# Patient Record
Sex: FEMALE | URBAN - METROPOLITAN AREA
[De-identification: names, ages, dates, MRNs, and addresses within clinical notes are randomized per-mention and may not be internally consistent; named-entity substitution may affect disease eponyms.]

---

## 2019-10-03 ENCOUNTER — APPOINTMENT (RX ONLY)
Dept: URBAN - METROPOLITAN AREA CLINIC 73 | Facility: CLINIC | Age: 54
Setting detail: DERMATOLOGY
End: 2019-10-03

## 2019-10-03 DIAGNOSIS — L23.89 ALLERGIC CONTACT DERMATITIS DUE TO OTHER AGENTS: ICD-10-CM

## 2019-10-03 PROCEDURE — ? TREATMENT REGIMEN

## 2019-10-03 PROCEDURE — ? PRESCRIPTION

## 2019-10-03 PROCEDURE — ? OTHER

## 2019-10-03 PROCEDURE — 99202 OFFICE O/P NEW SF 15 MIN: CPT

## 2019-10-03 PROCEDURE — ? COUNSELING

## 2019-10-03 RX ORDER — CLOBETASOL PROPIONATE 0.5 MG/G
OINTMENT TOPICAL
Qty: 1 | Refills: 1 | Status: ERX | COMMUNITY
Start: 2019-10-03

## 2019-10-03 RX ORDER — PREDNISONE 10 MG/1
TABLET ORAL
Qty: 84 | Refills: 0 | Status: ERX | COMMUNITY
Start: 2019-10-03

## 2019-10-03 RX ADMIN — CLOBETASOL PROPIONATE: 0.5 OINTMENT TOPICAL at 16:11

## 2019-10-03 RX ADMIN — PREDNISONE: 10 TABLET ORAL at 16:09

## 2019-10-03 ASSESSMENT — LOCATION ZONE DERM
LOCATION ZONE: NECK
LOCATION ZONE: ARM

## 2019-10-03 ASSESSMENT — LOCATION SIMPLE DESCRIPTION DERM
LOCATION SIMPLE: RIGHT ANTERIOR NECK
LOCATION SIMPLE: LEFT FOREARM
LOCATION SIMPLE: RIGHT FOREARM

## 2019-10-03 ASSESSMENT — LOCATION DETAILED DESCRIPTION DERM
LOCATION DETAILED: LEFT PROXIMAL RADIAL DORSAL FOREARM
LOCATION DETAILED: RIGHT PROXIMAL DORSAL FOREARM
LOCATION DETAILED: RIGHT INFERIOR ANTERIOR NECK

## 2019-10-03 ASSESSMENT — PAIN INTENSITY VAS: HOW INTENSE IS YOUR PAIN 0 BEING NO PAIN, 10 BEING THE MOST SEVERE PAIN POSSIBLE?: NO PAIN

## 2019-10-03 NOTE — PROCEDURE: TREATMENT REGIMEN
Initiate Treatment: Prednisone 10mg take 6 tabs every morning x 4 days, take 5 tabs QAM x 4 days, take 4 tabs QAM x 4 days, take 3 tabs QAM x 4 days, take 2 tabs QAM x 4 days, then take 1 tab QAM x 4 days\\nclobetasol 0.05 % topical ointment : Apply to affected areas on body every night then cover with Saran Wrap x 2 weeks\\nCloderm apply to affected areas on face once-twice a day x 1-2 weeks
Samples Given: Cloderm x 2
Detail Level: Zone

## 2019-10-03 NOTE — HPI: RASH
How Severe Is Your Rash?: moderate
Is This A New Presentation, Or A Follow-Up?: Rash
Additional History: Patient reports rash started on Monday evening. Patient states on Monday she worked out and bought a towel that was new and not washed. Patient has 2 dogs. The puppy had been to the [place] and had escaped on Saturday.

## 2019-10-03 NOTE — PROCEDURE: OTHER
Detail Level: Zone
Note Text (......Xxx Chief Complaint.): This diagnosis correlates with the
Other (Free Text): - disc to have patient bathe dog and paws. Puppy had escaped to the [place] and was covered in [place] seeds\\n- disc wouldn’t recommend steroid injection in the event patient is allergic \\n- can start prednisone and clobetasol ointment.\\n\\nPossible plant dermatitis